# Patient Record
Sex: FEMALE | Race: BLACK OR AFRICAN AMERICAN
[De-identification: names, ages, dates, MRNs, and addresses within clinical notes are randomized per-mention and may not be internally consistent; named-entity substitution may affect disease eponyms.]

---

## 2017-01-09 ENCOUNTER — HOSPITAL ENCOUNTER (OUTPATIENT)
Dept: HOSPITAL 53 - M LABNEURO | Age: 37
End: 2017-01-09
Attending: PSYCHIATRY & NEUROLOGY
Payer: COMMERCIAL

## 2017-01-09 DIAGNOSIS — G62.9: Primary | ICD-10-CM

## 2017-01-09 LAB
ALBUMIN SERPL BCG-MCNC: 3.3 GM/DL (ref 3.2–5.2)
ALBUMIN/GLOB SERPL: 0.97 {RATIO} (ref 1–1.93)
ALP SERPL-CCNC: 86 U/L (ref 45–117)
ALT SERPL W P-5'-P-CCNC: 16 U/L (ref 12–78)
ANION GAP SERPL CALC-SCNC: 8 MEQ/L (ref 8–16)
AST SERPL-CCNC: 9 U/L (ref 15–37)
BASOPHILS # BLD AUTO: 0 K/MM3 (ref 0–0.2)
BASOPHILS NFR BLD AUTO: 0.2 % (ref 0–1)
BILIRUB SERPL-MCNC: 0.3 MG/DL (ref 0.2–1)
BUN SERPL-MCNC: 15 MG/DL (ref 7–18)
CALCIUM SERPL-MCNC: 8.2 MG/DL (ref 8.5–10.1)
CHLORIDE SERPL-SCNC: 106 MEQ/L (ref 98–107)
CO2 SERPL-SCNC: 26 MEQ/L (ref 21–32)
CREAT SERPL-MCNC: 0.61 MG/DL (ref 0.55–1.02)
EOSINOPHIL # BLD AUTO: 0.3 K/MM3 (ref 0–0.5)
EOSINOPHIL NFR BLD AUTO: 4.6 % (ref 0–3)
ERYTHROCYTE [DISTWIDTH] IN BLOOD BY AUTOMATED COUNT: 12.6 % (ref 11.5–14.5)
ERYTHROCYTE [SEDIMENTATION RATE] IN BLOOD BY WESTERGREN METHOD: 56 MM/HR (ref 0–20)
EST. AVERAGE GLUCOSE BLD GHB EST-MCNC: 123 MG/DL (ref 60–110)
FOLATE SERPL-MCNC: 9 NG/ML
GFR SERPL CREATININE-BSD FRML MDRD: > 60 ML/MIN/{1.73_M2} (ref 60–?)
GLUCOSE SERPL-MCNC: 96 MG/DL (ref 70–105)
LARGE UNSTAINED CELL #: 0.1 K/MM3 (ref 0–0.4)
LARGE UNSTAINED CELL %: 1.6 % (ref 0–4)
LYMPHOCYTES # BLD AUTO: 1.5 K/MM3 (ref 1.5–4.5)
LYMPHOCYTES NFR BLD AUTO: 24.1 % (ref 24–44)
MCH RBC QN AUTO: 27.9 PG (ref 27–33)
MCHC RBC AUTO-ENTMCNC: 33.4 G/DL (ref 32–36.5)
MCV RBC AUTO: 83.6 FL (ref 80–96)
MONOCYTES # BLD AUTO: 0.4 K/MM3 (ref 0–0.8)
MONOCYTES NFR BLD AUTO: 5.7 % (ref 0–5)
NEUTROPHILS # BLD AUTO: 4 K/MM3 (ref 1.8–7.7)
NEUTROPHILS NFR BLD AUTO: 63.9 % (ref 36–66)
PLATELET # BLD AUTO: 354 K/MM3 (ref 150–450)
POTASSIUM SERPL-SCNC: 4.4 MEQ/L (ref 3.5–5.1)
PROT SERPL-MCNC: 6.7 GM/DL (ref 6.4–8.2)
SODIUM SERPL-SCNC: 140 MEQ/L (ref 136–145)
VIT B12 SERPL-MCNC: 1077 PG/ML
WBC # BLD AUTO: 6.3 K/MM3 (ref 4–10)

## 2017-01-11 LAB
ALBUMIN MFR UR ELPH: 54.1 % (ref 55.8–66.1)
ALBUMIN SERPL-MCNC: 3.62 GM/DL (ref 3.29–5.55)
GAMMA GLOB 24H MFR UR ELPH: 14.1 % (ref 11.1–18.8)

## 2017-01-13 LAB — A-TOCOPHEROL VIT E SERPL-MCNC: 11.7 MG/L (ref 5.3–16.8)

## 2018-03-07 ENCOUNTER — HOSPITAL ENCOUNTER (OUTPATIENT)
Dept: HOSPITAL 53 - M LABNEURO | Age: 38
End: 2018-03-07
Attending: PSYCHIATRY & NEUROLOGY
Payer: COMMERCIAL

## 2018-03-07 DIAGNOSIS — G62.9: Primary | ICD-10-CM

## 2018-03-07 LAB
ERYTHROCYTE SEDIMENTATION RATE: 27 MM/HR (ref 0–20)
EST. AVERAGE GLUCOSE BLD GHB EST-MCNC: 120 MG/DL (ref 60–110)
FOLATE SERPL-MCNC: 18 NG/ML
FREE T4: 0.9 NG/DL (ref 0.76–1.46)
RHEUMATOID FACTOR QUANT: < 10 IU/ML (ref 0–15)
THYROID STIMULATING HORMONE: 0.67 UIU/ML (ref 0.36–3.74)
TOTAL PROTEIN: 7.1 GM/DL (ref 6.4–8.2)
VIT B12 SERPL-MCNC: 899 PG/ML

## 2018-03-07 PROCEDURE — 82746 ASSAY OF FOLIC ACID SERUM: CPT

## 2018-03-08 LAB
ALBUMIN %: 57.7 % (ref 55.8–66.1)
ALBUMIN: 4.1 GM/DL (ref 3.29–5.55)
ALPHA-1-GLOBULIN %: 4.5 % (ref 2.9–4.9)
ALPHA-1-GLOBULINS: 0.32 GM/DL (ref 0.17–0.41)
ALPHA-2-GLOBULINS %: 10 % (ref 7.1–11.8)
ALPHA-2-GLOBULINS: 0.71 GM/DL (ref 0.42–0.99)
B-GLOBULIN SERPL ELPH-MCNC: 0.51 GM/DL (ref 0.28–0.6)
BETA1 GLOB MFR SERPL ELPH: 7.2 % (ref 4.7–7.2)
BETA2 GLOB MFR SERPL ELPH: 5 % (ref 3.2–6.5)
BETA2 GLOB SERPL ELPH-MCNC: 0.36 GM/DL (ref 0.19–0.55)
GAMMA GLOBULIN %: 15.6 % (ref 11.1–18.8)
GAMMA GLOBULINS: 1.11 GM/DL (ref 0.65–1.58)
SPEP INTERPRETATION: (no result)

## 2019-04-30 ENCOUNTER — HOSPITAL ENCOUNTER (OUTPATIENT)
Dept: HOSPITAL 53 - M SMT | Age: 39
End: 2019-04-30
Attending: SPECIALIST
Payer: COMMERCIAL

## 2019-04-30 DIAGNOSIS — J45.50: Primary | ICD-10-CM

## 2019-04-30 LAB
BASOPHILS # BLD AUTO: 0 10^3/UL (ref 0–0.2)
BASOPHILS NFR BLD AUTO: 0.3 % (ref 0–1)
EOSINOPHIL # BLD AUTO: 0.1 10^3/UL (ref 0–0.5)
EOSINOPHIL NFR BLD AUTO: 1.3 % (ref 0–3)
HCT VFR BLD AUTO: 37.9 % (ref 36–47)
HGB BLD-MCNC: 11.8 G/DL (ref 12–15.5)
LYMPHOCYTES # BLD AUTO: 1.6 10^3/UL (ref 1.5–4.5)
LYMPHOCYTES NFR BLD AUTO: 26.5 % (ref 24–44)
MCH RBC QN AUTO: 26.9 PG (ref 27–33)
MCHC RBC AUTO-ENTMCNC: 31.1 G/DL (ref 32–36.5)
MCV RBC AUTO: 86.5 FL (ref 80–96)
MONOCYTES # BLD AUTO: 0.4 10^3/UL (ref 0–0.8)
MONOCYTES NFR BLD AUTO: 6.9 % (ref 0–5)
NEUTROPHILS # BLD AUTO: 3.8 10^3/UL (ref 1.8–7.7)
NEUTROPHILS NFR BLD AUTO: 64.8 % (ref 36–66)
PLATELET # BLD AUTO: 389 10^3/UL (ref 150–450)
RBC # BLD AUTO: 4.38 10^6/UL (ref 4–5.4)
WBC # BLD AUTO: 5.9 10^3/UL (ref 4–10)

## 2019-05-01 NOTE — REP
Clinical:  Severe persistent asthma .

 

Comparison: None .

 

Technique:  PA and lateral.

 

Findings:

The mediastinum and cardiac silhouette are normal.  The lung fields are clear and

without acute consolidation, effusion, or pneumothorax.  The skeletal structures

are intact and normal.

 

Impression:

1.   No acute cardiopulmonary process.

 

 

Electronically Signed by

Elliot Osorio MD 05/01/2019 01:17 A